# Patient Record
Sex: FEMALE | Race: WHITE
[De-identification: names, ages, dates, MRNs, and addresses within clinical notes are randomized per-mention and may not be internally consistent; named-entity substitution may affect disease eponyms.]

---

## 2018-02-05 ENCOUNTER — HOSPITAL ENCOUNTER (EMERGENCY)
Dept: HOSPITAL 91 - FTE | Age: 63
Discharge: HOME | End: 2018-02-05
Payer: COMMERCIAL

## 2018-02-05 ENCOUNTER — HOSPITAL ENCOUNTER (EMERGENCY)
Age: 63
Discharge: HOME | End: 2018-02-05

## 2018-02-05 DIAGNOSIS — I10: ICD-10-CM

## 2018-02-05 DIAGNOSIS — J40: Primary | ICD-10-CM

## 2018-02-05 PROCEDURE — 71045 X-RAY EXAM CHEST 1 VIEW: CPT

## 2018-02-05 PROCEDURE — 99283 EMERGENCY DEPT VISIT LOW MDM: CPT

## 2018-11-24 ENCOUNTER — HOSPITAL ENCOUNTER (EMERGENCY)
Dept: HOSPITAL 91 - FTE | Age: 63
Discharge: HOME | End: 2018-11-24
Payer: COMMERCIAL

## 2018-11-24 ENCOUNTER — HOSPITAL ENCOUNTER (EMERGENCY)
Age: 63
Discharge: HOME | End: 2018-11-24

## 2018-11-24 DIAGNOSIS — I10: ICD-10-CM

## 2018-11-24 DIAGNOSIS — H11.31: Primary | ICD-10-CM

## 2018-11-24 PROCEDURE — 99282 EMERGENCY DEPT VISIT SF MDM: CPT

## 2019-03-01 ENCOUNTER — HOSPITAL ENCOUNTER (EMERGENCY)
Dept: HOSPITAL 10 - FTE | Age: 64
Discharge: HOME | End: 2019-03-01
Payer: COMMERCIAL

## 2019-03-01 ENCOUNTER — HOSPITAL ENCOUNTER (EMERGENCY)
Dept: HOSPITAL 91 - FTE | Age: 64
Discharge: HOME | End: 2019-03-01
Payer: COMMERCIAL

## 2019-03-01 VITALS — HEART RATE: 66 BPM | RESPIRATION RATE: 16 BRPM | SYSTOLIC BLOOD PRESSURE: 158 MMHG | DIASTOLIC BLOOD PRESSURE: 73 MMHG

## 2019-03-01 VITALS
BODY MASS INDEX: 38.09 KG/M2 | WEIGHT: 194.01 LBS | WEIGHT: 194.01 LBS | HEIGHT: 60 IN | HEIGHT: 60 IN | BODY MASS INDEX: 38.09 KG/M2

## 2019-03-01 DIAGNOSIS — L60.0: Primary | ICD-10-CM

## 2019-03-01 DIAGNOSIS — I10: ICD-10-CM

## 2019-03-01 PROCEDURE — 73660 X-RAY EXAM OF TOE(S): CPT

## 2019-03-01 PROCEDURE — 11750 EXCISION NAIL&NAIL MATRIX: CPT

## 2019-03-01 PROCEDURE — 99283 EMERGENCY DEPT VISIT LOW MDM: CPT

## 2019-03-01 RX ADMIN — LIDOCAINE HYDROCHLORIDE 1 ML: 10 INJECTION, SOLUTION INFILTRATION; PERINEURAL at 13:18

## 2019-03-01 RX ADMIN — ACETAMINOPHEN 1 MG: 325 TABLET, FILM COATED ORAL at 13:16

## 2019-03-01 NOTE — ERD
ER Documentation


Chief Complaint


Chief Complaint





pt bib self with c/o right pinky toe pain for a few a wks





HPI


63-year-old female presents with right fifth toe pain for last 3 weeks.  She d


enies any history of trauma.  She has an abnormal nail on that toe.  Pain may 


have started after trying to give herself a pedicure.  Scribes pain is 8 out of 


10, without radiation, throbbing, and sharp.





ROS


All systems reviewed and are negative except as per history of present illness.





Medications


Home Meds


Active Scripts


Neomycin [facility]/Bacitrac Zn/Poly (Triple Antibiotic Ointment) 28 Gm Oint...g., 28 GM


TP TID for 7 Days


   Prov:RITA GAMEZ MD         3/1/19


Acetaminophen* (Tylophen*) 500 Mg Capsule, 1 CAP PO Q6H PRN for PAIN AND OR 


ELEVATED TEMP, #20 CAP


   Prov:RITA GAMEZ MD         3/1/19


Guaifenesin* (Robitussin*) 100 Mg/5 Ml Syrup, 200 MG PO Q6H PRN for COUGH for 3 


Days, ML


   Prov:BLANCO CASTRO         2/5/18


Methylprednisolone* (Medrol* DOSE PACK) 4 Mg/Dose-Pack Tab.ds.pk, 4 MG PO .AS 


DIRECTED for 6 Days, PACKET


   Prov:BLANCO CASTRO         2/5/18


Fexofenadine Hcl* (Allegra*) 180 Mg Tablet, 180 MG PO DAILY, #30 TAB


   Prov:BLANCO CASTRO         2/5/18


Ibuprofen* (Motrin*) 600 Mg Tab, 600 MG PO Q6, #30 TAB


   Prov:JOANNE GIL PA-C         5/28/16


Azithromycin* (Azithromycin*) 250 Mg Tablet, 500 MG PO ONCE, #1 TAB


   Prov:DARY LAMB I. NP         3/6/16


Azithromycin* (Azithromycin*) 250 Mg Tablet, 250 MG PO DAILY, #4 TAB


   Prov:DARY LAMB I. NP         3/6/16


Acetaminophen* (Tylophen*) 500 Mg Capsule, 1000 MG PO Q6H PRN for FEVER for 10 


Days, TAB


   Prov:DARY LAMB I. NP         3/6/16


Benzocaine/Menthol ([facility] SORE THROAT LOZENGE) 1 Each Lozenge, 1 EACH MM Q2H for 


10 Days, LOZENGE


   Prov:DARY LAMB I. NP         3/6/16


Cetirizine Hcl* (Zyrtec*) 10 Mg Capsule, 10 MG PO DAILY, #10 TAB.CHEW


   Prov:DARY LAMB. NP         3/6/16


Reported Medications


[Vitamin C]   No Conflict Check


   1/18/10





Allergies


Allergies:  


Coded Allergies:  


     No Known Drug Allergy (Verified  Allergy, Mild, 12/30/14)





PMhx/Soc


History of Surgery:  Yes (HYSTERECTOMY, MASTECTOMY, THYROIDECTOMY)


Anesthesia Reaction:  No


Hx Neurological Disorder:  No


Hx Respiratory Disorders:  No


Hx Cardiac Disorders:  Yes (HTN)


Hx Psychiatric Problems:  No


Hx Miscellaneous Medical Probl:  Yes (HYPERLIPIDEMIA)


Hx Alcohol Use:  No


Hx Substance Use:  No


Hx Tobacco Use:  No


Smoking Status:  Never smoker





FmHx


Family History:  No diabetes, No coronary disease, No other





Physical Exam


Vitals





Vital Signs


  Date      Temp  Pulse  Resp  B/P (MAP)   Pulse Ox  O2          O2 Flow    FiO2


Time                                                 Delivery    Rate


    3/1/19  98.3     66    16      158/73        99


     12:45                          (101)





Physical Exam


Const:   No acute distress


Head:   Atraumatic 


Eyes:    Normal Conjunctiva


ENT:    Normal External Ears, Nose and Mouth.


Neck:               Full range of motion. No meningismus.


Resp:   Clear to auscultation bilaterally


Cardio:   Regular rate and rhythm, no murmurs


Abd:    Soft, non tender, non distended. Normal bowel sounds


Skin:   No petechiae or rashes


Back:   No midline or flank tenderness


Ext:    No cyanosis, or edema.  Right fifth toe with mycotic nail thickened.  No


erythema, significant swelling, discharge or bleeding.  She is tender in the 


lateral aspect of the nail.


Neur:   Awake and alert


Psych:    Normal Mood and Affect


Results 24 hrs





Current Medications


 Medications
   Dose
          Sig/Isabella
       Start Time
   Status  Last


 (Trade)       Ordered        Route
 PRN     Stop Time              Admin
Dose


                              Reason                                Admin


                650 mg         ONCE  ONCE
    3/1/19        DC            3/1/19


Acetaminophen                 PO
            13:30
 3/1/19                13:16




  (Tylenol                                  13:31


Tab)


 Lidocaine
     20 ml          ONCE  ONCE
    3/1/19        DC       



(Xylocaine                    SC
            13:30
 3/1/19


1%
  (Mdv) 20                                13:31


ml)








Procedures/MDM


X-ray right fifth toe 2V Interpreted by me: 


Bones:    Question nondisplaced base of the fifth digit phalanges


Joints:       No dislocation


Foreign Body:    None.  Impression- no significant acute findings on right fifth


toe x-ray.  Questionable nondisplaced fracture at the base of the digit although


does not correlate with patient's history or symptoms.  No evidence of 


osteomyelitis.





She elected for nail avulsion as ingrown nail likely cause of pain.  Procedure 


note-right fifth toe was prepped with Betadine.  2 cc lidocaine was used to 


perform digital block.  Using forceps and scissors the mycotic nail was removed 


in its entirety.  Patient tolerated procedure well and the wound was dressed.





He will be discharged home with return precautions worsening redness, fevers, 


discharge, new worsening symptoms otherwise with primary doctor for wound check.


 We will Otherwise treat with Tylenol and triple antibiotic cream.  The patient 


was stable with no new complaints during the ER course. Clinically, there is no 


current evidence to suggest meningitis, sepsis, acute abdomen, pneumonia, 


stroke,  acute coronary syndrome, pulmonary embolism, aortic dissection or any 


other emergent condition appearing to require further evaluation or 


hospitalization.  Patient counseled regarding my diagnostic impression and care 


plan. Prior to discharge all questions answered. Pt agrees with treatment plan 


and understands strict return precautions. Pt is instructed to follow up with 


primary care provider within 24-48 hours. Precautionary instructions provided 


including instructions to return to the ER if not improving or for any worsening


or changing symptoms or concerns.





Departure


Diagnosis:  


   Primary Impression:  


   Ingrown toenail of right foot


Condition:  Stable


Patient Instructions:  Ingrown Toenail, Excised





Additional Instructions:  


x ray normal hoy. Cheque otro vez con hays doctor primario en el proximo ybarra or 


regresa para mas o nueva simptomas- mas lerma , fiebre, pus, .











RITA GAMEZ MD              Mar 1, 2019 13:33